# Patient Record
Sex: MALE | Race: WHITE | NOT HISPANIC OR LATINO | URBAN - METROPOLITAN AREA
[De-identification: names, ages, dates, MRNs, and addresses within clinical notes are randomized per-mention and may not be internally consistent; named-entity substitution may affect disease eponyms.]

---

## 2020-05-19 ENCOUNTER — HOSPITAL ENCOUNTER (EMERGENCY)
Facility: HOSPITAL | Age: 55
Discharge: HOME | End: 2020-05-19
Attending: FAMILY MEDICINE

## 2020-05-19 VITALS
DIASTOLIC BLOOD PRESSURE: 76 MMHG | HEIGHT: 69 IN | RESPIRATION RATE: 18 BRPM | SYSTOLIC BLOOD PRESSURE: 102 MMHG | WEIGHT: 120 LBS | HEART RATE: 87 BPM | TEMPERATURE: 99 F | BODY MASS INDEX: 17.77 KG/M2 | OXYGEN SATURATION: 97 %

## 2020-05-19 DIAGNOSIS — Z59.00 HOMELESSNESS: Primary | ICD-10-CM

## 2020-05-19 PROCEDURE — 99283 EMERGENCY DEPT VISIT LOW MDM: CPT

## 2020-05-19 SDOH — SOCIAL DETERMINANTS OF HEALTH (SDOH): HOMELESSNESS UNSPECIFIED: Z59.00

## 2020-05-19 NOTE — ED TRIAGE NOTES
Pt presents to er via amr with complaints of ams. Ems advised that male pt was found by police behind a building in [facility]. Pt was unable to answer questions for police and was transported for evaluation. Currently pt is awake and alert and oriented to place and person. Pt currently able to hold conversation but rambles and has wondering lucid thoughts. Does not appear to be hallucinating. Poor general appearance pt advised he is homeless and has been nomadic for the last year or so. Vss. Pt oriented to room. Voiced no needs at this time.

## 2020-05-19 NOTE — ED PROVIDER NOTES
Encounter Date: 5/19/2020       History     Chief Complaint   Patient presents with    Altered Mental Status     55-year-old male presents brought in per EMS after being found outside living homeless, patient states he did not intend to come in but apparently was offered this option by the police he denies any confusion he denies any suicidal homicidal ideation and plans to walk [place]        Review of patient's allergies indicates:  No Known Allergies  Past Medical History:   Diagnosis Date    Hepatitis      History reviewed. No pertinent surgical history.  History reviewed. No pertinent family history.  Social History     Tobacco Use    Smoking status: Current Every Day Smoker     Packs/day: 0.50     Years: 15.00     Pack years: 7.50     Types: Cigarettes   Substance Use Topics    Alcohol use: Not Currently    Drug use: Not Currently     Review of Systems   Constitutional: Negative for fever.   HENT: Negative for sore throat.    Respiratory: Negative for shortness of breath.    Cardiovascular: Negative for chest pain.   Gastrointestinal: Negative for nausea.   Genitourinary: Negative for dysuria.   Musculoskeletal: Negative for back pain.   Skin: Negative for rash.   Neurological: Negative for weakness.   Hematological: Does not bruise/bleed easily.       Physical Exam     Initial Vitals [05/19/20 0021]   BP Pulse Resp Temp SpO2   102/76 87 18 98.5 °F (36.9 °C) 97 %      MAP       --         Physical Exam    Nursing note and vitals reviewed.  Constitutional: He appears well-developed and well-nourished. He is not diaphoretic. No distress.   HENT:   Head: Normocephalic and atraumatic.   Right Ear: External ear normal.   Left Ear: External ear normal.   Nose: Nose normal.   Mouth/Throat: Oropharynx is clear and moist. No oropharyngeal exudate.   Eyes: EOM are normal.   Neck: Normal range of motion. Neck supple. No tracheal deviation present.   Cardiovascular: Normal  rate and regular rhythm.   No murmur heard.  Pulmonary/Chest: Breath sounds normal. No stridor. No respiratory distress. He has no rales.   Abdominal: Soft. He exhibits no distension and no mass. There is no tenderness. There is no rebound.   Musculoskeletal: Normal range of motion. He exhibits no edema.   Lymphadenopathy:     He has no cervical adenopathy.   Neurological: He is alert and oriented to person, place, and time. He has normal strength.   Skin: Skin is warm and dry. Capillary refill takes less than 2 seconds. No pallor.   Psychiatric: He has a normal mood and affect.         ED Course   Procedures  Labs Reviewed - No data to display       Imaging Results    None                                          Clinical Impression:       ICD-10-CM ICD-9-CM   1. Homelessness Z59.0 V60.0             ED Disposition Condition    Discharge Stable        ED Prescriptions     None        Follow-up Information    None                                    Manish Tian MD  05/19/20 2550

## 2021-08-24 ENCOUNTER — LAB REQUISITION (OUTPATIENT)
Dept: LAB | Age: 56
End: 2021-08-24

## 2021-08-24 DIAGNOSIS — Z00.00 ENCOUNTER FOR GENERAL ADULT MEDICAL EXAMINATION WITHOUT ABNORMAL FINDINGS: ICD-10-CM

## 2021-08-24 LAB — 25(OH)D3+25(OH)D2 SERPL-MCNC: 27.1 NG/ML (ref 30–100)

## 2021-08-24 PROCEDURE — 82306 VITAMIN D 25 HYDROXY: CPT | Performed by: CLINICAL MEDICAL LABORATORY

## 2021-08-24 PROCEDURE — PSEU8229 VITAMIN D -25 HYDROXY: Performed by: CLINICAL MEDICAL LABORATORY

## 2021-09-30 ENCOUNTER — LAB REQUISITION (OUTPATIENT)
Dept: LAB | Age: 56
End: 2021-09-30

## 2021-09-30 DIAGNOSIS — Z00.00 ENCOUNTER FOR GENERAL ADULT MEDICAL EXAMINATION WITHOUT ABNORMAL FINDINGS: ICD-10-CM

## 2021-09-30 PROCEDURE — 80342 ANTIPSYCHOTICS NOS 1-3: CPT | Performed by: CLINICAL MEDICAL LABORATORY

## 2021-09-30 PROCEDURE — PSEU9726 OLANZAPINE LEVEL: Performed by: CLINICAL MEDICAL LABORATORY

## 2021-10-23 LAB — OLANZAPINE SERPL-MCNC: 40 NG/ML

## 2021-10-26 ENCOUNTER — LAB REQUISITION (OUTPATIENT)
Dept: LAB | Age: 56
End: 2021-10-26

## 2021-10-26 PROCEDURE — PSEU9726 OLANZAPINE LEVEL: Performed by: CLINICAL MEDICAL LABORATORY

## 2021-10-26 PROCEDURE — 80342 ANTIPSYCHOTICS NOS 1-3: CPT | Performed by: CLINICAL MEDICAL LABORATORY

## 2021-11-12 LAB — OLANZAPINE SERPL-MCNC: 62 NG/ML

## 2021-12-02 ENCOUNTER — LAB REQUISITION (OUTPATIENT)
Dept: LAB | Age: 56
End: 2021-12-02

## 2021-12-02 LAB — 25(OH)D3+25(OH)D2 SERPL-MCNC: 39.5 NG/ML (ref 30–100)

## 2021-12-02 PROCEDURE — 82306 VITAMIN D 25 HYDROXY: CPT | Performed by: CLINICAL MEDICAL LABORATORY

## 2021-12-02 PROCEDURE — PSEU8229 VITAMIN D -25 HYDROXY: Performed by: CLINICAL MEDICAL LABORATORY
